# Patient Record
(demographics unavailable — no encounter records)

---

## 2024-10-07 NOTE — DISCUSSION/SUMMARY
[EKG obtained to assist in diagnosis and management of assessed problem(s)] : EKG obtained to assist in diagnosis and management of assessed problem(s) [FreeTextEntry1] : Atypical neck arm pain  masood neuro related  Echo Lvef 55  ETT ordered

## 2024-10-07 NOTE — ADDENDUM
[FreeTextEntry1] : Yunior Bustamante assisted in documentation on Oct 7 2024 acting as a scribe for Dr. Doug Martinez.

## 2024-10-07 NOTE — HISTORY OF PRESENT ILLNESS
[FreeTextEntry1] : 10/7/24 - Eleno Hernandez, a 46-year-old male came in for a check-up. His chief complaint was a constant feeling of tightness in his hands and arms. This tightness is not painful but rather a source of annoyance. Because of this discomfort, he finds wearing long sleeves, ties or anything around the neck area to be particularly irritating. Mr. Hernandez denied that the discomfort leads to pain when questioned.  - He has no history of chronic illnesses such as blood pressure or diabetes and does not smoke. His family history includes his father having high blood pressure, but no instances of stroke or heart disease among immediate family members. He does not have any allergies and is not currently taking any medications. He did however mention a history of PTSD from his childhood.  - Mr. Hernandez admitted to not exercising regularly. He also has no recent hospitalizations and by his account, appears to be generally healthy.  - Upon further investigation into his symptoms, it appears unlikely that his discomfort is related to heart issues. I explained to Mr. Hernandez that angina typically triggers a discomfort in the chest due to activity and his arm discomfort due to clothing is not characteristic of angina. The possibility of peripheral artery disease was also considered. However, given that Mr. Hernandez is not a smoker or diabetic, the likelihood of peripheral artery disease is highly unlikely. Furthermore, blocked arteries in the arms are rare and usually more prevalent in the legs.  - Towards the end of our conversation, Mr. Hernandez revealed he dealt with a slipped disc in his lower back previously. Given the unlikely circulatory-related origin of his discomfort, it was proposed that his symptoms may have a neurological component, possibly associated with his history of a slipped disc. A follow-up exploration concerning this aspect of his condition is recommended. Pt has periodic dyspnea with walking

## 2025-05-15 NOTE — DISCUSSION/SUMMARY
[FreeTextEntry1] : will monitor LDL off meds for now  Crp and Lp a added  [EKG obtained to assist in diagnosis and management of assessed problem(s)] : EKG obtained to assist in diagnosis and management of assessed problem(s)

## 2025-05-15 NOTE — ADDENDUM
[FreeTextEntry1] : Yunior Bustamante assisted in documentation on May 15 2025 acting as a scribe for Dr. Doug Martinez.

## 2025-05-15 NOTE — HISTORY OF PRESENT ILLNESS
[FreeTextEntry1] : 10/7/24 - Eleno Hernandez, a 46-year-old male came in for a check-up. His chief complaint was a constant feeling of tightness in his hands and arms. This tightness is not painful but rather a source of annoyance. Because of this discomfort, he finds wearing long sleeves, ties or anything around the neck area to be particularly irritating. Mr. Hernandez denied that the discomfort leads to pain when questioned.  - He has no history of chronic illnesses such as blood pressure or diabetes and does not smoke. His family history includes his father having high blood pressure, but no instances of stroke or heart disease among immediate family members. He does not have any allergies and is not currently taking any medications. He did however mention a history of PTSD from his childhood.  - Mr. Hernandez admitted to not exercising regularly. He also has no recent hospitalizations and by his account, appears to be generally healthy.  - Upon further investigation into his symptoms, it appears unlikely that his discomfort is related to heart issues. I explained to Mr. Hernandez that angina typically triggers a discomfort in the chest due to activity and his arm discomfort due to clothing is not characteristic of angina. The possibility of peripheral artery disease was also considered. However, given that Mr. Hernandez is not a smoker or diabetic, the likelihood of peripheral artery disease is highly unlikely. Furthermore, blocked arteries in the arms are rare and usually more prevalent in the legs.  - Towards the end of our conversation, Mr. Hernandez revealed he dealt with a slipped disc in his lower back previously. Given the unlikely circulatory-related origin of his discomfort, it was proposed that his symptoms may have a neurological component, possibly associated with his history of a slipped disc. A follow-up exploration concerning this aspect of his condition is recommended. Pt has periodic dyspnea with walking   5/15/25 recent calcium score was zero high ldl not on meds ett was neg  lost wgt over 6 m

## 2025-06-23 NOTE — ADDENDUM
[FreeTextEntry1] : Yunior Bustamante assisted in documentation on Jun 23 2025 acting as a scribe for Dr. Doug Martinez.

## 2025-06-23 NOTE — HISTORY OF PRESENT ILLNESS
[FreeTextEntry1] : 10/7/24 - Eleno Hernandez, a 46-year-old male came in for a check-up. His chief complaint was a constant feeling of tightness in his hands and arms. This tightness is not painful but rather a source of annoyance. Because of this discomfort, he finds wearing long sleeves, ties or anything around the neck area to be particularly irritating. Mr. Hernandez denied that the discomfort leads to pain when questioned.  - He has no history of chronic illnesses such as blood pressure or diabetes and does not smoke. His family history includes his father having high blood pressure, but no instances of stroke or heart disease among immediate family members. He does not have any allergies and is not currently taking any medications. He did however mention a history of PTSD from his childhood.  - Mr. Hernandez admitted to not exercising regularly. He also has no recent hospitalizations and by his account, appears to be generally healthy.  - Upon further investigation into his symptoms, it appears unlikely that his discomfort is related to heart issues. I explained to Mr. Hernandez that angina typically triggers a discomfort in the chest due to activity and his arm discomfort due to clothing is not characteristic of angina. The possibility of peripheral artery disease was also considered. However, given that Mr. Hernandez is not a smoker or diabetic, the likelihood of peripheral artery disease is highly unlikely. Furthermore, blocked arteries in the arms are rare and usually more prevalent in the legs.  - Towards the end of our conversation, Mr. Hernandez revealed he dealt with a slipped disc in his lower back previously. Given the unlikely circulatory-related origin of his discomfort, it was proposed that his symptoms may have a neurological component, possibly associated with his history of a slipped disc. A follow-up exploration concerning this aspect of his condition is recommended. Pt has periodic dyspnea with walking   5/15/25 recent calcium score was zero high ldl not on meds ett was neg  lost wgt over 6 m   6/2325 on red yeast rise for LDL   had muscle pain on red yeast